# Patient Record
Sex: MALE | Race: BLACK OR AFRICAN AMERICAN | Employment: OTHER | URBAN - METROPOLITAN AREA
[De-identification: names, ages, dates, MRNs, and addresses within clinical notes are randomized per-mention and may not be internally consistent; named-entity substitution may affect disease eponyms.]

---

## 2017-10-23 ENCOUNTER — OFFICE VISIT (OUTPATIENT)
Dept: NEUROLOGY | Age: 77
End: 2017-10-23

## 2017-10-23 DIAGNOSIS — R44.3 HALLUCINATIONS: ICD-10-CM

## 2017-10-23 DIAGNOSIS — R41.3 MEMORY LOSS: Primary | ICD-10-CM

## 2017-10-23 RX ORDER — DONEPEZIL HYDROCHLORIDE 10 MG/1
10 TABLET, FILM COATED ORAL
Qty: 30 TAB | Refills: 5 | Status: SHIPPED | OUTPATIENT
Start: 2017-10-23 | End: 2018-08-14 | Stop reason: SDUPTHER

## 2017-10-23 RX ORDER — PANTOPRAZOLE SODIUM 40 MG/1
TABLET, DELAYED RELEASE ORAL
COMMUNITY
Start: 2017-08-30

## 2017-10-23 NOTE — MR AVS SNAPSHOT
Visit Information Date & Time Provider Department Dept. Phone Encounter #  
 10/23/2017 11:00 AM Robert Smith MD [facility] [phone] [id number] Upcoming Health Maintenance Date Due DTaP/Tdap/Td series (1 - Tdap) 1/5/1960 ZOSTER VACCINE AGE 60> 11/5/1998 GLAUCOMA SCREENING Q2Y 1/5/2004 Pneumococcal 65+ Low/Medium Risk (1 of 2 - PCV13) 1/5/2004 MEDICARE YEARLY EXAM 1/5/2004 INFLUENZA AGE 9 TO ADULT 8/1/2017 Allergies as of 10/23/2017  Never Reviewed Not on File Current Immunizations  Never Reviewed No immunizations on file. Not reviewed this visit You Were Diagnosed With   
  
 Codes Comments Memory loss    -  Primary ICD-10-CM: R41.3 ICD-9-CM: 780.93 Hallucinations     ICD-10-CM: R44.3 ICD-9-CM: 780.1 Preferred Pharmacy Pharmacy Name Phone [facility] [address] [place] [address] [phone] Your Updated Medication List  
  
   
This list is accurate as of: 10/23/17 12:05 PM.  Always use your most recent med list.  
  
  
  
  
 donepezil 10 mg tablet Commonly known as:  ARICEPT Take 1 Tab by mouth nightly. Take one tablet at night  
  
 pantoprazole 40 mg tablet Commonly known as:  PROTONIX Prescriptions Sent to Pharmacy Refills  
 donepezil (ARICEPT) 10 mg tablet 5 Sig: Take 1 Tab by mouth nightly. Take one tablet at night Class: Normal  
 Pharmacy: [facility] [address] [place] AT [address] #: [phone] Route: Oral  
  
We Performed the Following REFERRAL TO PSYCHOLOGY [ZVZ74 Custom] Comments:  
 Pt with short term memory loss. Please eval  
 TSH 3RD GENERATION [id number] CPT(R)] VITAMIN B12 & FOLATE [70075 CPT(R)] To-Do List   
 10/24/2017 Imaging:  CT HEAD WO CONT   
  
 10/24/2017 Lab: VITAMIN D, 1, 25 DIHYDROXY Referral Information Referral ID Referred By Referred To  
  
 [id number] Megan Denny [address] [place] [address] [address], [place] Phone: [phone] Fax: [phone] Visits Status Start Date End Date 1 New Request 10/23/17 10/23/18 If your referral has a status of pending review or denied, additional information will be sent to support the outcome of this decision. Patient Instructions Patient Instructions/Plans: · We will get a CT of the head and lab work · We will order neuropsychological testing · We will start on Aricept 10 mg daily for memory loss Donepezil (By mouth) Donepezil ([id number]) Treats Alzheimer disease. Brand Name(s): Aricept There may be other brand names for this medicine. When This Medicine Should Not Be Used: This medicine is not right for everyone. Do not use it if you had an allergic reaction to donepezil or to medicines that contain piperidine. How to Use This Medicine:  
Tablet, Dissolving Tablet · Your doctor will tell you how much medicine to use. Do not use more than directed. · Read and follow the patient instructions that come with this medicine. Talk to your doctor or pharmacist if you have any questions. · Tablet: Swallow the 23-mg tablet whole. Do not crush, break, or chew it. · Disintegrating tablet:Make sure your hands are dry before you handle the disintegrating tablet. Peel back the foil from the blister pack, then remove the tablet. Do not push the tablet through the foil. Place the tablet in your mouth. After it has melted, swallow or take a drink of water. · Missed dose: Skip the missed dose and take your next dose at the regular time. Do not take extra medicine to make up for a missed dose. · Store the medicine in a closed container at room temperature, away from heat, moisture, and direct light. Drugs and Foods to Avoid: Ask your doctor or pharmacist before using any other medicine, including over-the-counter medicines, vitamins, and herbal products. · Some medicines can affect how donepezil works. Tell your doctor if you are using any of the following: ¨ Bethanechol, carbamazepine, dexamethasone, ketoconazole, phenobarbital, phenytoin, quinidine, or rifampin ¨ NSAID pain or arthritis medicine (such as aspirin, diclofenac, ibuprofen, naproxen) Warnings While Using This Medicine: · Tell your doctor if you are pregnant or breastfeeding, or if you have heart disease, lung disease, asthma or other breathing problems, stomach ulcers or bleeding, or trouble urinating. · This medicine may cause the following problems: ¨ Slow heartbeat ¨ Stomach or bowel bleeding ¨ Seizures · Tell any doctor or dentist who treats you that you are using this medicine. You may need to stop using this medicine several days before you have surgery or medical tests. · Your doctor will check your progress and the effects of this medicine at regular visits. Keep all appointments. · Keep all medicine out of the reach of children. Never share your medicine with anyone. Possible Side Effects While Using This Medicine:  
Call your doctor right away if you notice any of these side effects: · Allergic reaction: Itching or hives, swelling in your face or hands, swelling or tingling in your mouth or throat, chest tightness, trouble breathing · Bloody or black, tarry stools · Change in how much or how often you urinate · Chest pain, slow or uneven heartbeat, trouble breathing · Lightheadedness, dizziness, fainting · Seizures · Severe stomach pain · Unusual bleeding, bruising, or weakness · Vomiting of blood or material that looks like coffee grounds If you notice these less serious side effects, talk with your doctor: · Mild nausea, vomiting, or diarrhea · Weight loss If you notice other side effects that you think are caused by this medicine, tell your doctor. Call your doctor for medical advice about side effects. You may report side effects to FDA at 6-896-EIN-4292 © 2017 [employer] Information is for End User's use only and may not be sold, redistributed or otherwise used for commercial purposes. The above information is an  only. It is not intended as medical advice for individual conditions or treatments. Talk to your doctor, nurse or pharmacist before following any medical regimen to see if it is safe and effective for you. Introducing \[facility] [place] [facility]\"" & HEALTH SERVICES! [facility] introduces MYFLY patient portal. Now you can access parts of your medical record, email your doctor's office, and request medication refills online. 1. In your internet browser, go to [email or web address] 2. Click on the First Time User? Click Here link in the Sign In box. You will see the New Member Sign Up page. 3. Enter your MYFLY Access Code exactly as it appears below. You will not need to use this code after youve completed the sign-up process. If you do not sign up before the expiration date, you must request a new code. · MYFLY Access Code: [email or web address] Expires: 1/21/2018 12:03 PM 
 
4. Enter the last four digits of your Social Security Number (xxxx) and Date of Birth (mm/dd/yyyy) as indicated and click Submit. You will be taken to the next sign-up page. 5. Create a MYFLY ID. This will be your MYFLY login ID and cannot be changed, so think of one that is secure and easy to remember. 6. Create a MYFLY password. You can change your password at any time. 7. Enter your Password Reset Question and Answer. This can be used at a later time if you forget your password. 8. Enter your e-mail address. You will receive e-mail notification when new information is available in [address] 9. Click Sign Up. You can now view and download portions of your medical record. 10. Click the Download Summary menu link to download a portable copy of your medical information. If you have questions, please visit the Frequently Asked Questions section of the Apruve website. Remember, Apruve is NOT to be used for urgent needs. For medical emergencies, dial 911. Now available from your iPhone and Android! Please provide this summary of care documentation to your next provider. If you have any questions after today's visit, please call [phone].

## 2017-10-23 NOTE — PATIENT INSTRUCTIONS
Patient Instructions/Plans:  · We will get a CT of the head and lab work  · We will order neuropsychological testing  · We will start on Aricept 10 mg daily for memory loss    Donepezil (By mouth)   Donepezil ([id number])  Treats Alzheimer disease. Brand Name(s): Aricept   There may be other brand names for this medicine. When This Medicine Should Not Be Used: This medicine is not right for everyone. Do not use it if you had an allergic reaction to donepezil or to medicines that contain piperidine. How to Use This Medicine:   Tablet, Dissolving Tablet  · Your doctor will tell you how much medicine to use. Do not use more than directed. · Read and follow the patient instructions that come with this medicine. Talk to your doctor or pharmacist if you have any questions. · Tablet: Swallow the 23-mg tablet whole. Do not crush, break, or chew it. · Disintegrating tablet:Make sure your hands are dry before you handle the disintegrating tablet. Peel back the foil from the blister pack, then remove the tablet. Do not push the tablet through the foil. Place the tablet in your mouth. After it has melted, swallow or take a drink of water. · Missed dose: Skip the missed dose and take your next dose at the regular time. Do not take extra medicine to make up for a missed dose. · Store the medicine in a closed container at room temperature, away from heat, moisture, and direct light. Drugs and Foods to Avoid:   Ask your doctor or pharmacist before using any other medicine, including over-the-counter medicines, vitamins, and herbal products. · Some medicines can affect how donepezil works.  Tell your doctor if you are using any of the following:   ¨ Bethanechol, carbamazepine, dexamethasone, ketoconazole, phenobarbital, phenytoin, quinidine, or rifampin  ¨ NSAID pain or arthritis medicine (such as aspirin, diclofenac, ibuprofen, naproxen)  Warnings While Using This Medicine:   · Tell your doctor if you are pregnant or breastfeeding, or if you have heart disease, lung disease, asthma or other breathing problems, stomach ulcers or bleeding, or trouble urinating. · This medicine may cause the following problems:   ¨ Slow heartbeat  ¨ Stomach or bowel bleeding  ¨ Seizures  · Tell any doctor or dentist who treats you that you are using this medicine. You may need to stop using this medicine several days before you have surgery or medical tests. · Your doctor will check your progress and the effects of this medicine at regular visits. Keep all appointments. · Keep all medicine out of the reach of children. Never share your medicine with anyone. Possible Side Effects While Using This Medicine:   Call your doctor right away if you notice any of these side effects:  · Allergic reaction: Itching or hives, swelling in your face or hands, swelling or tingling in your mouth or throat, chest tightness, trouble breathing  · Bloody or black, tarry stools  · Change in how much or how often you urinate  · Chest pain, slow or uneven heartbeat, trouble breathing  · Lightheadedness, dizziness, fainting  · Seizures  · Severe stomach pain  · Unusual bleeding, bruising, or weakness  · Vomiting of blood or material that looks like coffee grounds  If you notice these less serious side effects, talk with your doctor:   · Mild nausea, vomiting, or diarrhea  · Weight loss  If you notice other side effects that you think are caused by this medicine, tell your doctor. Call your doctor for medical advice about side effects. You may report side effects to FDA at 6-123-FDA-6407  © 2017 [address] Information is for End User's use only and may not be sold, redistributed or otherwise used for commercial purposes. The above information is an  only. It is not intended as medical advice for individual conditions or treatments.  Talk to your doctor, nurse or pharmacist before following any medical regimen to see if it is safe and effective for you.

## 2017-10-23 NOTE — PROGRESS NOTES
NEUROLOGY NEW PATIENT CONSULTATION    REFERRED BY:  No primary care provider on file. CHIEF COMPLAINT:  Memory loss    HISTORY OF PRESENT ILLNESS    HISTORY PROVIDED BY:  Patient  Family Member: Daughter      Yovani Reyes is a 65 y.o. male who I am asked to see in consultation for memory loss. Patient has had issues with his memory for over a year. He previously lived in [facility] and just moved here to be with his daughter. Now that he has been living with his daughter over the past month she has noticed that his memory changes have been more significant. Today today they noticed that he has issues with recall. He is constantly asking same questions again. He will good days and bad days. Patient recognizes on memory loss. He saw a special on the TV about Alzheimer's disease and also start he is concerned that he may have this. He does have a family history of dementia and his sister. Patient does well in the mornings and gets worse as the day goes on. He does have some days that are better. He does not have any issues with wandering but one that he did get up in the night and going another room of the house and was confused about why he was there. Patient is also developed visual hallucinations. He sees animals such as a fish or a snake in the couch. He also will think some of these been in the home and they have not. Patient will repeat himself. He does have issues with word recall. They were at the grocery store the other day and he forgot when a marshmallow was. He felt like his to be in the produce section. He has never handled his own finances. His daughter handles that for now. Additionally he is on multiple medications daily but his daughter helps manage that as well. Patient did have some concern for depression previously. He was placed on Cymbalta and this has helped. He does have some issues with sleeping at night. However, he does nap quite a lot during the day.   He is started on melatonin. He feels like when he sleeps well at night he has a better day the next day. His daughter also notes that he is an occasional hand tremor on the left hand. He is left-hand-dominant. He will have episodes where he will get frozen with his gait at times. She seems to associate this when he has an occasional cigarette. He does have a history of alcohol abuse. She notes that when he drinks any alcohol now this seems to exacerbate his symptoms. He limits that now. Patient does not cook or clean. He will microwave something but that is all. Patient will take walks of his daughter takes in. Otherwise he is not very motivated to do things on his own. He has had some urinary incontinence. He denies any new vision changes. PMH  History of bypass. Patient does have a defibrillator. History of GI bleed  COPD  History of alcohol abuse    SH  Social History     Social History    Marital status:      Spouse name: N/A    Number of children: N/A    Years of education: N/A     Social History Main Topics    Smoking status: Not on file    Smokeless tobacco: Not on file    Alcohol use Not on file    Drug use: Not on file    Sexual activity: Not on file     Other Topics Concern    Not on file     Social History Narrative       FH  Sister with dementia    ALLERGIES   ace inhibitors     CURRENT MEDS  Current Outpatient Prescriptions   Medication Sig Dispense Refill    pantoprazole (PROTONIX) 40 mg tablet       donepezil (ARICEPT) 10 mg tablet Take 1 Tab by mouth nightly.  Take one tablet at night 30 Tab 5   Valsartan  Potassium  Atorvastatin  Furosemide  Duloxetine 60 mg daily  Carvedilol  Melatonin  Pro-air  Patient was previously on Plavix and aspirin but has stopped this due to history of GI bleed    REVIEW OF SYSTEMS:     Y  N       Y  N  Y  N   Y  N  [] [x] AIDS          [] [x] Falls  [x] [] Memory Loss  [] [x]  Shortness of breath  [] [x] Anxiety          [] [x] Fatigue [] [x] Muscle Pain        [] [x]  Skipped beats  [] [x] Chest Pain   [] [x] Frequent HA [] [x] Ms Weakness     [x] []  Snoring  [] [x] Constipation [] [x]Hearing loss [] [x] Nause/Vomiting  [] [x]  Stomach Pain  [] [x] Cough          [] [x]Hepatitis [] [x] Neuropathy         [] [x]  Swallowing difficulty  [x] [] Depression  [] [x]Incontinence [x] [] Poor appetite      [] [x]  Vertigo  [] [x] Diarrhea       [] [x] Joint Pain [] [x] Rash                   [] [x]  Visual disturbances  [] [x] Fainting        [] [x] Leg Swelling [] [x] Ringing ears       [x] [x]  Weight changes      []Unable to obtain  ROS due to  []mental status change  []sedated   []intubated          PREVIOUS WORKUP  IMAGING: None      LABS  No results found for this or any previous visit. PHYSICAL EXAM  Blood pressure 95/60 pulse 83  General:  Alert, cooperative, no distress. Head:  Normocephalic, without obvious abnormality, atraumatic. Eyes:  Conjunctivae/corneas clear. Pupils equal, round, reactive to light. Extraocular movements intact, VFF, NO papilledema   Lungs:  Heart:   Non labored breathing  Regular rate and rhythm, no carotid bruits   Abdomen:   Soft, non-distended   Extremities: Extremities normal, atraumatic, no cyanosis or edema. Pulses: 2+ and symmetric all extremities. Skin: Skin color, texture, turgor normal. No rashes or lesions. Neurologic:  Gen: Attention normal             Language: naming, repetition, fluency normal             Memory: Alert and oriented to self and partial date. 1/3 word recall. 3/3 with category cue.   Unable to spell world backwards  Cranial Nerves:  I: smell Not tested   II: visual fields Full to confrontation   II: pupils Equal, round, reactive to light   II: optic disc No papilledema   III,VII: ptosis none   III,IV,VI: extraocular muscles  Full ROM   V: mastication normal   V: facial light touch sensation  normal   VII: facial muscle function   symmetric   VIII: hearing symmetric   IX: soft palate elevation  normal   XI: trapezius strength  5/5   XI: sternocleidomastoid strength 5/5   XI: neck flexion strength  5/5   XII: tongue  midline     Motor: Positive cogwheel rigidity, no tremor              Strength: 5/5 all four extremities  Sensory: intact to LT, PP, vibration, and temperature  Coordination: FTN intact, Rhomberg negative  Gait: Slightly wide-based  Reflexes: 2+ throughout       IMPRESSION  Jovan Terry is a 65 y.o. male who presents for evaluation of short-term memory loss. This has been progressive over the past year. Patient has not had evaluation for reversible causes. He does have some issues with gait changes and urinary incontinence so will rule out NPH with a CT of the head. We will also check memory labs. Additionally I think patient would benefit from neuropsych testing. At this point I do think we should start him on Aricept given his testing and history today. RECOMMENDATIONS  1.  CT of the head  (patient unable to get MRI due to defibrillator)  2. Check TSH, vitamin B12, vitamin D  3. Discussed dementia, pseudodementia, and Lewy body dementia  4. Start Aricept 10 mg daily. Discussed possible side effects of orthostatic hypotension. Patient will monitor closely for this and take the medication at night. Side effects discussed. Information given to patient. Prescription sent in  5. Referral for neuropsychological testing  6. Continue Cymbalta for depression  7. Encourage patient to be socially, physically, and mentally active    FU 3 months    Edvin Culp MD    CC: No primary care provider on file. Fax: None    This note was created using voice recognition software. Despite editing, there may be syntax errors. This note will not be viewable in [address]

## 2017-10-24 DIAGNOSIS — R44.3 HALLUCINATIONS: ICD-10-CM

## 2017-10-24 DIAGNOSIS — R41.3 MEMORY LOSS: ICD-10-CM

## 2017-10-25 LAB
1,25(OH)2D3 SERPL-MCNC: 25.8 PG/ML (ref 19.9–79.3)
FOLATE SERPL-MCNC: >20 NG/ML
TSH SERPL DL<=0.005 MIU/L-ACNC: 2.63 UIU/ML (ref 0.45–4.5)
VIT B12 SERPL-MCNC: 841 PG/ML (ref 211–946)

## 2017-10-31 ENCOUNTER — HOSPITAL ENCOUNTER (OUTPATIENT)
Dept: CT IMAGING | Age: 77
Discharge: HOME | End: 2017-10-31
Attending: PSYCHIATRY & NEUROLOGY
Payer: MEDICARE

## 2017-10-31 DIAGNOSIS — R41.3 MEMORY LOSS: ICD-10-CM

## 2017-10-31 DIAGNOSIS — R44.3 HALLUCINATIONS: ICD-10-CM

## 2017-10-31 PROCEDURE — 70450 CT HEAD/BRAIN W/O DYE: CPT

## 2017-11-02 ENCOUNTER — TELEPHONE (OUTPATIENT)
Dept: NEUROLOGY | Age: 77
End: 2017-11-02

## 2017-11-02 NOTE — TELEPHONE ENCOUNTER
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 Mrs. Jadyn Morrison can be reached at [phone].

## 2017-11-02 NOTE — TELEPHONE ENCOUNTER
Contacted patient's daughter Maynor Raman and informed her that the CT did not show any acute abnormalities. She is concerned because she states she needs a diagnosis for dementia. I asked her is the patient has his neuro phych, she says no, I advised her to make this appointment with number provided and for him to continue taking his Aricept as ordered. Understanding voiced.

## 2017-11-20 ENCOUNTER — OFFICE VISIT (OUTPATIENT)
Dept: NEUROLOGY | Age: 77
End: 2017-11-20

## 2017-11-20 DIAGNOSIS — R41.3 MEMORY LOSS: ICD-10-CM

## 2017-11-20 DIAGNOSIS — F10.11 ALCOHOL ABUSE, IN REMISSION: ICD-10-CM

## 2017-11-20 DIAGNOSIS — G31.84 MILD COGNITIVE IMPAIRMENT: Primary | ICD-10-CM

## 2017-11-20 DIAGNOSIS — R44.3 HALLUCINATIONS: ICD-10-CM

## 2017-11-20 NOTE — PROGRESS NOTES
[address] [facility],[address]  [address]   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 Please refer to his medical records for details pertaining to his history. He completed the 11th grade without repeating a grade. He is not currently working. Briefly, the patient reported problems with memory getting worse over the past year or more. He used to live in [place] and moved here to be with his daughter, who has noticed marked decline in memory. He asks the same question over and over. Good days and bad days. Worse towards the end of the day. Losing words. Starts tasks and does not complete. Word finding problems. He is confused. He is hallucinating. He sees animals like fish and snakes on the couch. Thinks people in the house that are not there. Dementia - his sister had it as well. They were at the grocery store the other day and he forgot when a marshmallow was. He felt like his to be in the produce section. He has never done his own bills and daughter does that now, and daughter assists with medications also. He has a prior history of depression and was put on Cymbalta which was helpful. He naps a lot during the day. Tried melatonin. Gait seems frozen when he is smoking. He has a history of alcohol abuse and continues to drink but to a lesser extent. He does not cook or clean save for using the microwave. He has had some urinary incontinence. He denies any new vision changes. He has been on Aricept for about a month now. No meds for mood or psychosis. Gets ornery at night. No changes behaviors. Not driving.    Gait is declining and getting more assistance with toileting and bathing of late. No previous neuropsych testing. Neuropsychological Mental Status Exam (NMSE):  Historian: Tiarra, daughter assists  Praxis: No UE apraxia  R/L Orientation: Intact to self and to other  Dress: within normal limits   Weight: within normal limits   Appearance/Hygiene: within normal limits   Gait: not assessed  Assistive Devices: WC  Mood: mildly depressed  Affect: Flat   Comprehension: within normal limits   Thought Process: within normal limits   Expressive Language: within normal limits   Receptive Language: within normal limits   Motor:  No cognitive or motor perseveration  ETOH: history of alcohol abuse and has stopped essentially but had a sip of wine or so last night. Tobacco: Denied  Illicit: Denied  SI/HI: Denied  Psychosis: visual hallucinations as noted  Insight:Fair  Judgment: TBD  Other Psych:      History reviewed. No pertinent past medical history. History reviewed. No pertinent surgical history. Allergies   Allergen Reactions    Ace Inhibitors Hives       History reviewed. No pertinent family history. Social History   Substance Use Topics    Smoking status: None    Smokeless tobacco: None    Alcohol use None       Current Outpatient Prescriptions   Medication Sig Dispense Refill    pantoprazole (PROTONIX) 40 mg tablet       donepezil (ARICEPT) 10 mg tablet Take 1 Tab by mouth nightly. Take one tablet at night 30 Tab 5         Plan:  Obtain authorization for testing from insurance company. Report to follow once testing, scoring, and interpretation completed. ? Organic based neurocognitive issues versus mood disorder or combination of same. ? Problems organic, functional, or both? This note will not be viewable in [address] 65year old male with progressive decline in memory as well as psychosis. History of depression. 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   Eval needed for MCI, dementia, mood related issues, combination of same?

## 2018-06-11 ENCOUNTER — OFFICE VISIT (OUTPATIENT)
Dept: NEUROLOGY | Age: 78
End: 2018-06-11

## 2018-06-11 DIAGNOSIS — R41.3 MEMORY LOSS: ICD-10-CM

## 2018-06-11 DIAGNOSIS — R44.3 HALLUCINATIONS: ICD-10-CM

## 2018-06-11 DIAGNOSIS — F10.11 ALCOHOL ABUSE, IN REMISSION: ICD-10-CM

## 2018-06-11 DIAGNOSIS — G31.84 MILD COGNITIVE IMPAIRMENT: Primary | ICD-10-CM

## 2018-06-14 ENCOUNTER — OFFICE VISIT (OUTPATIENT)
Dept: NEUROLOGY | Age: 78
End: 2018-06-14

## 2018-06-14 DIAGNOSIS — F32.A ANXIETY AND DEPRESSION: ICD-10-CM

## 2018-06-14 DIAGNOSIS — F10.21 ALCOHOL DEPENDENCE IN EARLY, EARLY PARTIAL, SUSTAINED FULL, OR SUSTAINED PARTIAL REMISSION (HCC): ICD-10-CM

## 2018-06-14 DIAGNOSIS — F41.9 ANXIETY AND DEPRESSION: ICD-10-CM

## 2018-06-14 DIAGNOSIS — F03.92 DEMENTIA WITH PSYCHOSIS: Primary | ICD-10-CM

## 2018-06-14 NOTE — PROGRESS NOTES
[address] [facility],5Th [address]  [address]   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 left handed    male who was accompanied by his daughter  to the initial clinical interview on 11/20/17 . Please refer to his medical records for details pertaining to his history. He completed the 11th grade without repeating a grade. He is not currently working. Briefly, the patient reported problems with memory getting worse over the past year or more. He used to live in [place] and moved here to be with his daughter, who has noticed marked decline in memory. He asks the same question over and over. Good days and bad days. Worse towards the end of the day. Losing words. Starts tasks and does not complete. Word finding problems. He is confused. He is hallucinating. He sees animals like fish and snakes on the couch. Thinks people in the house that are not there. Dementia - his sister had it as well. They were at the grocery store the other day and he forgot when a marshmallow was. He felt like his to be in the produce section. He has never done his own bills and daughter does that now, and daughter assists with medications also. He has a prior history of depression and was put on Cymbalta which was helpful. He naps a lot during the day. Tried melatonin. Gait seems frozen when he is smoking. He has a history of alcohol abuse and continues to drink but to a lesser extent. He does not cook or clean save for using the microwave. He has had some urinary incontinence. He denies any new vision changes. He has been on Aricept for about a month now. No meds for mood or psychosis. Gets ornery at night. No changes behaviors. Not driving.    Gait is declining and getting more assistance with toileting and bathing of late. No previous neuropsych testing. Neuropsychological Mental Status Exam (NMSE):  Historian: Tiarra, daughter assists  Praxis: No UE apraxia  R/L Orientation: Intact to self and to other  Dress: within normal limits   Weight: within normal limits   Appearance/Hygiene: within normal limits   Gait: not assessed  Assistive Devices: WC  Mood: mildly depressed  Affect: Flat   Comprehension: within normal limits   Thought Process: within normal limits   Expressive Language: within normal limits   Receptive Language: within normal limits   Motor:  No cognitive or motor perseveration  ETOH: history of alcohol abuse and has stopped essentially but had a sip of wine or so last night. Tobacco: Denied  Illicit: Denied  SI/HI: Denied  Psychosis: visual hallucinations as noted  Insight:Fair  Judgment: TBD  Other Psych:      History reviewed. No pertinent past medical history. History reviewed. No pertinent surgical history. Allergies   Allergen Reactions    Ace Inhibitors Hives       History reviewed. No pertinent family history. Social History   Substance Use Topics    Smoking status: None    Smokeless tobacco: None    Alcohol use None       Current Outpatient Prescriptions   Medication Sig Dispense Refill    pantoprazole (PROTONIX) 40 mg tablet       donepezil (ARICEPT) 10 mg tablet Take 1 Tab by mouth nightly. Take one tablet at night 30 Tab 5         Plan:  Obtain authorization for testing from insurance company. Report to follow once testing, scoring, and interpretation completed. ? Organic based neurocognitive issues versus mood disorder or combination of same. ? Problems organic, functional, or both? This note will not be viewable in [address] 65year old male with progressive decline in memory as well as psychosis. History of depression. 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   Eval needed for MCI, dementia, mood related issues, combination of same? Neuropsychological Evaluation  Patient Testing 6/11/18 & 6/14/18 Report Completed 6/14/18  A Psychometrist Assisted w/ portions of this evaluation while under my direct supervision    Please refer to the patient's initial interview progress note (copied above) and her medical records for details pertaining to her history. Today's neuropsychological test scores follow: The following assessment procedures were performed:      Neuropsychologist Performed, Interpreted, & Reported: Neuropsychological Mental Status Exam, Revised Memory & Behavior Checklist, Mini Mental State Exam, Clock Drawing Test, Test Of Premorbid Functioning, Safia-Melzack Pain Questionnaire,  History Taking  & Clinical Interview With The Patient, Additional History Taking w/ The Patient's Daughter,  ABAS-3, Review Of Available Records. Psychometrist Administered & Neuropsychologist Interpreted & Neuropsychologist Reported: Verbal Fluency Tests, Elvin & Elvin - Revised, CPT-III, Repeatable Battery For The Assessment Of Neuropsychological Status, Bessie Dementia Rating Scale - 2, WASI-II, Trailmaking Test Parts A & B, Geriatric Depression Inventory, Olsen Anxiety Inventory, River Symptom Checklist.     Test Findings:  Note:  The patients raw data have been compared with currently available norms which include demographic corrections for age, gender, and/or education. Sometimes, the patients scores are compared to demographically similar individuals as close to the patients age, education level, etc., as possible. \"Average\" is viewed as being +/- 1 standard deviation (SD) from the stated mean for a particular test score. \"Low average\" is viewed as being between 1 and 2 SD below the mean, and above average is viewed as being 1 and 2 SD above the mean.   Scores falling in the borderline range (between 1-1/2 and 2 SD below the mean) are viewed with particular attention as to whether they are normal or abnormal neurocognitive test scores. Other methods of inference in analyzing the test data are also utilized, including the pattern and range of scores in the profile, bilateral motor functions, and the presence, if any, of pathognomonic signs. Behaviorally, the patient was friendly and cooperative and appeared motivated to perform well during this examination. Within this context, the results of this evaluation are viewed as a valid reflection of the patients actual neurocognitive and emotional status. The patient's score of 17/30 on the Mini-Mental Status Exam was impaired. In this regard, he was not oriented to year, season, month, day, date, county, city, or floor. Registration of three words was 2/3 correct on Trial 1. Backwards spelling was 4/5 correct. Recall for three words after a brief delay was 2/3 correct. Writing was impaired. Visual construction was impaired. Clock drawing was severely impaired. His structured word list fluency, as assessed by the FAS Test, was within the mildly impaired range with a T score of 39. Category fluency was within the below average range with a T score of 42. Confrontation naming ability, as assessed by the [facility] - Revised, was within the mildly impaired range at 34/60 correct (T = 39). This pattern of performance is indicative of a patient who is at increased risk for day-to-day problems with verbal fluency and confrontation naming ability was also impaired. The patient was administered the Wechsler Abbreviated Scale of Intelligence - II and he generated an Estimated VCI score of 76 (5th %ile), DANNY - 59 (0.5th %ile), and his Estimated Full-4 IQ score was 66 (1st %ile). These scores are statistically weaker than what would be predicted given his performance on a task estimating premorbid functioning levels, and this pattern of performance is suggestive of a decline from premorbid Intellectual Functioning.   All of his generated IQ scores are within the low average range. The patient was administered the CPT-III and review of the subscales revealed numerous problems with inattentiveness without concerns for problems with impulsivity. This pattern of performance is indicative of a patient who is at increased risk for day-to-day problems with sustained visual attention/concentration. The patient was administered the Bessie Dementia Rating Scale -2 and his total score of 96/144 correct corresponds to an age- and education- corrected MOANS Scaled Score of 1 (<1st %ile) and indicates a severely impaired level of performance. In this regard, his simple attention was normal.  At the same time, his performance across all other neurocognitive domains assessed, including initiation/perseveration, construction, conceptualization, and memory capacity were all impaired. This pattern of performance is indicative of a patient who is at increased risk for day-to-day problems across a variety of neurocognitive domains. The patient was administered the Repeatable Battery for the Assessment Of Neuropsychological Status (RBANS) and his age-corrected scores are as follows:    Domain:    Index Score %ile Classification    Immediate Memory  49  <0.1  Severe Impairment    Delayed Memory  48  <0.1 Severe Impairment    Visuospatial/Constructional  50  <0.1 Severe Impairment    Language   68  2 Severe Impairment    Attention   53  0.1 Severe Impairment    Overall Functioning  48  <0.1 Severe Impairment   ___________________________________________________________    As can be seen, he is showing marked deficits across a variety of neurocognitive domains with no areas of neurocognitive strength identified. This pattern of performance is indicative of a patient who is at increased risk for day-to-day problems across a variety of neurocognitive domains.        Simple timed visual motor sequencing (Trailmaking Test Part A) was within the mildly to moderately impaired range with a T score of 32. His performance on a similar, but more complex task of timed visual motor sequencing (Trailmaking Test Part B) was within the mildly to moderately impaired range with a T score of 34. This latter test was discontinued. This pattern of performance is indicative of a patient who is at increased risk for day-to-day problems with executive functioning. The patient rated his current level of pain as \"2/5 - Discomforting\" on the Safia-Melzack Pain Questionnaire. He reported pain his lower back. His Geriatric Depression Inventory score of 12 was within the mildly depressed range. His Olsen Anxiety Inventory Score of 13 reflected mild anxiety. The patient's responses on the [facility] Symptom Checklist did not reveal concern for additional psychopathology. The family completed the ABAS-3 and reported borderline range concerns regarding the patient's general adaptive skills, conceptual skills, social skills, and practical skills. Impressions and Recommendations: This patient generated an abnormal range Neuropsychological Evaluation with respect to neurocognitive functioning. In this regard, impairments were noted across the vast majority of neurocognitive domains assessed. Some language and simple attention abilities remain important areas of relative neurocognitive strength, but these likely mask underlying cognitive deficits at times. Emotionally, there is concern for mild depression and anxiety. He has a history of alcohol abuse in partial sustained remission. He is psychotic as well and this appears dementia related. In my opinion, this profile is consistent with an evolving organic process that is currently at a moderate level of severity. This is likely AD - the profile doesn't quite fit with alcohol related dementia, but that may have been a factor as well.  Anxiety and depression issues exacerbate, but this is not a pseudodementia. I suggest consideration for memory management medication as well as treatment for depression and anxiety. Psychiatric treatment for psychosis is also advised. Consider non-stimulant medication for attention if not medically contraindicated. I advise abstinence from alcohol. The patient should be encouraged to remain as mentally, socially, and physically active as possible. The patient's generated cognitive difficulties are significant to the degree whereby it is my opinion that he should not live independently without appropriate supervision across virtually all ADLs, but especially those pertaining to memory. This includes nutritional/meal preparation supervision, medication management supervision, and supervision of financial dealings. The family already assists with same and so long as they are able to do so, I agree with this plan. Should their current living status change, the family and patient may wish to consider placement in an assisted living type facility that can provide appropriate supervision and safety for the patient. I agree that he should not be driving. I do not believe that he is competent to manage his own affairs at this time. Baseline now established. Follow up prn. Clinical correlation is, of course, indicated. I will discuss these findings with the patient and family when they follow up with me in the near future. A follow up Neuropsychological Evaluation is indicated on a prn basis. DIAGNOSES:Moderate Dementia With Psychosis    Anxiety and Depression     The above information is based upon information currently available to me. If there is any additional information of which I am currently unaware, I would be more than happy to review it upon having it made available to me. Thank you for the opportunity to see this interesting individual.     Sincerely,       Nayeli Manley.  Anam Vazquez, EdS      Cc: Dr. Carmelo Doyle    Time Documentation    2 units -11638-  1.75 hours. Record review. Review of history provided by patient. Testing by Neuropsychologist, Review of raw data. Scoring. Interpretation of all tests together in context/ Data interpretation/integration. Case Conceptualization, Report writing. Coordination Of Care. 4 units  -[id number] - 3.75 hours. Psychometrist test prep, administration, and scoring. Supervision Of Psychometrist. Neuropsych Interpretation of individual tests completed by psychometrist    \"Unit\" is defined by CPT/National Guidelines (31 - 60 minutes). Integral services including scoring of raw data, data interpretation, case conceptualization, report writing etcetera were initiated after the patient finished testing/raw data collected and was completed on the date the report was signed.

## 2018-06-14 NOTE — PROGRESS NOTES
Patient arrived for testing but all orderedtests not completed due to his slow time to completion. Will return and report to follow once testing, scoring, and interpretation completed.

## 2018-08-14 ENCOUNTER — OFFICE VISIT (OUTPATIENT)
Dept: NEUROLOGY | Age: 78
End: 2018-08-14

## 2018-08-14 ENCOUNTER — TELEPHONE (OUTPATIENT)
Dept: NEUROLOGY | Age: 78
End: 2018-08-14

## 2018-08-14 DIAGNOSIS — F03.92 DEMENTIA WITH PSYCHOSIS: Primary | ICD-10-CM

## 2018-08-14 DIAGNOSIS — R41.3 MEMORY LOSS: ICD-10-CM

## 2018-08-14 DIAGNOSIS — F41.9 ANXIETY AND DEPRESSION: ICD-10-CM

## 2018-08-14 DIAGNOSIS — F32.A ANXIETY AND DEPRESSION: ICD-10-CM

## 2018-08-14 DIAGNOSIS — F10.21 ALCOHOL DEPENDENCE IN EARLY, EARLY PARTIAL, SUSTAINED FULL, OR SUSTAINED PARTIAL REMISSION (HCC): ICD-10-CM

## 2018-08-14 DIAGNOSIS — R44.3 HALLUCINATIONS: ICD-10-CM

## 2018-08-14 RX ORDER — DONEPEZIL HYDROCHLORIDE 10 MG/1
10 TABLET, FILM COATED ORAL
Qty: 30 TAB | Refills: 3 | Status: SHIPPED | OUTPATIENT
Start: 2018-08-14 | End: 2018-09-12 | Stop reason: SDUPTHER

## 2018-08-14 NOTE — PROGRESS NOTES
Office feedback session with the patient and family today. I reviewed the results of the recent Neuropsychological Evaluation, including discussing individual tests as well as patient's areas of neurocognitive strength versus weakness. Education was provided regarding my diagnostic impressions, and we discussed treatment plan/options. I also answered numerous questions related to the clinical findings, including discussing various methods to improve cognition and mood. Counseling provided regarding mood and cognition. Discussed severe dementia with psychosis. Needs to see psychiatry and neurology for treatment. Prognosis poor. Assisted living type assistance discussed. Not competent. He is off of the Risperidone now,and he is more active and going tot he bathroom on his own. Hehad a couple of episodes of confusion. Doesn't seem to be hallucinating as much. Rest has a lot to do with it. Using medical marijuana. CBT and supportive psychotherapy techniques were utilized. The patient will follow up with the referring provider, and reported being very pleased with the services provided. Follow up with [facility] ([place], [place]) prn. 20 minutes with patient and familyt, record review, coordination of care. Records provided. We discussed: This patient generated an abnormal range Neuropsychological Evaluation with respect to neurocognitive functioning. In this regard, impairments were noted across the vast majority of neurocognitive domains assessed. Some language and simple attention abilities remain important areas of relative neurocognitive strength, but these likely mask underlying cognitive deficits at times. Emotionally, there is concern for mild depression and anxiety. He has a history of alcohol abuse in partial sustained remission.   He is psychotic as well and this appears dementia related.                             In my opinion, this profile is consistent with an evolving organic process that is currently at a moderate level of severity. This is likely AD - the profile doesn't quite fit with alcohol related dementia, but that may have been a factor as well. Anxiety and depression issues exacerbate, but this is not a pseudodementia. I suggest consideration for memory management medication as well as treatment for depression and anxiety. Psychiatric treatment for psychosis is also advised. Consider non-stimulant medication for attention if not medically contraindicated. I advise abstinence from alcohol. The patient should be encouraged to remain as mentally, socially, and physically active as possible.                             The patient's generated cognitive difficulties are significant to the degree whereby it is my opinion that he should not live independently without appropriate supervision across virtually all ADLs, but especially those pertaining to memory. This includes nutritional/meal preparation supervision, medication management supervision, and supervision of financial dealings. The family already assists with same and so long as they are able to do so, I agree with this plan. Should their current living status change, the family and patient may wish to consider placement in an assisted living type facility that can provide appropriate supervision and safety for the patient. I agree that he should not be driving. I do not believe that he is competent to manage his own affairs at this time. Baseline now established. Follow up prn. Clinical correlation is, of course, indicated.                           I will discuss these findings with the patient and family when they follow up with me in the near future.   A follow up Neuropsychological Evaluation is indicated on a prn basis.       DIAGNOSES:Moderate Dementia With Psychosis                                              Anxiety and Depression

## 2018-08-14 NOTE — TELEPHONE ENCOUNTER
Patient needs a refill on his aricept, he saw Dr. Cheryl Guan today afterwards his daughter came and asked about the medication.

## 2018-09-10 DIAGNOSIS — R41.3 MEMORY LOSS: ICD-10-CM

## 2018-09-10 DIAGNOSIS — R44.3 HALLUCINATIONS: ICD-10-CM

## 2018-09-12 RX ORDER — DONEPEZIL HYDROCHLORIDE 10 MG/1
10 TABLET, FILM COATED ORAL
Qty: 30 TAB | Refills: 3 | Status: SHIPPED | OUTPATIENT
Start: 2018-09-12 | End: 2019-01-11 | Stop reason: SDUPTHER

## 2018-09-12 NOTE — TELEPHONE ENCOUNTER
Patient needed script to go to [facility] Please review and sign if approved. Patient has an appt. With [facility] tomorrow discussed with daughter about the importance of keeping that appt.

## 2018-09-12 NOTE — TELEPHONE ENCOUNTER
Patient's daughter Belkys Garcia called stating that her father was prescribed aricept and had called for a refill last week to the [facility] delivery [facility] but [facility]. Daughter would like to have medicine ordered as soon as possible. Dr. Sy Watkins stated he should be put back on medicine asap following testing.  [phone]

## 2018-09-13 ENCOUNTER — OFFICE VISIT (OUTPATIENT)
Dept: NEUROLOGY | Age: 78
End: 2018-09-13

## 2018-09-13 VITALS
BODY MASS INDEX: 17.75 KG/M2 | WEIGHT: 124 LBS | RESPIRATION RATE: 20 BRPM | HEART RATE: 100 BPM | OXYGEN SATURATION: 98 % | HEIGHT: 70 IN | SYSTOLIC BLOOD PRESSURE: 102 MMHG | DIASTOLIC BLOOD PRESSURE: 64 MMHG

## 2018-09-13 DIAGNOSIS — F02.80 LATE ONSET ALZHEIMER'S DISEASE WITHOUT BEHAVIORAL DISTURBANCE (HCC): Primary | ICD-10-CM

## 2018-09-13 DIAGNOSIS — R41.3 MEMORY LOSS: ICD-10-CM

## 2018-09-13 DIAGNOSIS — G30.1 LATE ONSET ALZHEIMER'S DISEASE WITHOUT BEHAVIORAL DISTURBANCE (HCC): Primary | ICD-10-CM

## 2018-09-13 DIAGNOSIS — R44.3 HALLUCINATIONS: ICD-10-CM

## 2018-09-13 RX ORDER — DULOXETIN HYDROCHLORIDE 30 MG/1
30 CAPSULE, DELAYED RELEASE ORAL DAILY
COMMUNITY

## 2018-09-13 RX ORDER — DIGOXIN 125 MCG
TABLET ORAL DAILY
COMMUNITY

## 2018-09-13 RX ORDER — CARVEDILOL 6.25 MG/1
TABLET ORAL 2 TIMES DAILY WITH MEALS
COMMUNITY

## 2018-09-13 RX ORDER — NITROGLYCERIN 0.4 MG/1
0.4 TABLET SUBLINGUAL
COMMUNITY

## 2018-09-13 RX ORDER — TAMSULOSIN HYDROCHLORIDE 0.4 MG/1
0.4 CAPSULE ORAL DAILY
COMMUNITY

## 2018-09-13 RX ORDER — FAMOTIDINE 20 MG/1
20 TABLET, FILM COATED ORAL 2 TIMES DAILY
COMMUNITY

## 2018-09-13 RX ORDER — LORAZEPAM 0.5 MG/1
0.5 TABLET ORAL
COMMUNITY

## 2018-09-13 RX ORDER — FUROSEMIDE 40 MG/1
TABLET ORAL AS NEEDED
COMMUNITY

## 2018-09-13 RX ORDER — TRAZODONE HYDROCHLORIDE 50 MG/1
50 TABLET ORAL AS NEEDED
COMMUNITY

## 2018-09-13 RX ORDER — LOSARTAN POTASSIUM 25 MG/1
TABLET ORAL DAILY
COMMUNITY

## 2018-09-13 RX ORDER — PENTOXIFYLLINE 400 MG/1
400 TABLET, EXTENDED RELEASE ORAL 2 TIMES DAILY
COMMUNITY

## 2018-09-13 NOTE — MR AVS SNAPSHOT
[address] [facility] [address] [id number] [phone] Patient: Aman Breen MRN: [id number] Visit Information Date & Time Provider Department Dept. Phone Encounter #  
 9/13/2018 11:00 AM Yolie Harp NP [facility] Neurology [facility] [phone] [id number] Follow-up Instructions Return in about 6 weeks (around 10/25/2018). Upcoming Health Maintenance Date Due DTaP/Tdap/Td series (1 - Tdap) 1/5/1960 ZOSTER VACCINE AGE 60> 11/5/1998 GLAUCOMA SCREENING Q2Y 1/5/2004 Pneumococcal 65+ Low/Medium Risk (1 of 2 - PCV13) 1/5/2004 MEDICARE YEARLY EXAM 3/14/2018 Influenza Age 5 to Adult 8/1/2018 Allergies as of 9/13/2018  Review Complete On: 9/13/2018 By: Mallory Urbano LPN Severity Noted Reaction Type Reactions Ace Inhibitors  10/23/2017    Hives Current Immunizations  Never Reviewed No immunizations on file. Not reviewed this visit You Were Diagnosed With   
  
 Codes Comments Late onset Alzheimer's disease without behavioral disturbance    -  Primary ICD-10-CM: G30.1, F02.80 ICD-9-CM: 331.0, 294.10 Memory loss     ICD-10-CM: R41.3 ICD-9-CM: 780.93 Hallucinations     ICD-10-CM: R44.3 ICD-9-CM: 780.1 Vitals BP Pulse Resp Height(growth percentile) Weight(growth percentile) SpO2  
 102/64 100 20 5' 10\" (1.778 m) 124 lb (56.2 kg) 98% BMI  
  
  
  
  
 17.79 kg/m2 Vitals History BMI and BSA Data Body Mass Index Body Surface Area  
 17.79 kg/m 2 1.67 m 2 Preferred Pharmacy Pharmacy Name Phone [address] [place] [address] [phone] Your Updated Medication List  
  
   
This list is accurate as of 9/13/18 12:09 PM.  Always use your most recent med list.  
  
  
  
  
 carvedilol 6.25 mg tablet Commonly known as:  Reuben Vega  
 Take  by mouth two (2) times daily (with meals). CYMBALTA 30 mg capsule Generic drug:  DULoxetine Take 30 mg by mouth daily. digoxin 0.125 mg tablet Commonly known as:  LANOXIN Take  by mouth daily. donepezil 10 mg tablet Commonly known as:  ARICEPT Take 1 Tab by mouth nightly. Take one tablet at night FLOMAX 0.4 mg capsule Generic drug:  tamsulosin Take 0.4 mg by mouth daily. furosemide 40 mg tablet Commonly known as:  LASIX Take  by mouth as needed. iron bisgly,ps-FA-B-C#12-succ 65 mg-65 mg -1,000 mcg (24) Tab Take  by mouth. LORazepam 0.5 mg tablet Commonly known as:  ATIVAN Take 0.5 mg by mouth every eight (8) hours as needed for Anxiety. losartan 25 mg tablet Commonly known as:  COZAAR Take  by mouth daily. nitroglycerin 0.4 mg SL tablet Commonly known as:  NITROSTAT  
0.4 mg by SubLINGual route every five (5) minutes as needed for Chest Pain. Up to 3 doses. pantoprazole 40 mg tablet Commonly known as:  PROTONIX  
  
 pentoxifylline  mg CR tablet Commonly known as:  TRENTAL Take 400 mg by mouth two (2) times a day. PEPCID 20 mg tablet Generic drug:  famotidine Take 20 mg by mouth two (2) times a day. traZODone 50 mg tablet Commonly known as:  Conrad Franklin Take 50 mg by mouth as needed for Sleep. XARELTO 15 mg Tab tablet Generic drug:  rivaroxaban Take  by mouth daily. Follow-up Instructions Return in about 6 weeks (around 10/25/2018). Patient Instructions A Healthy Lifestyle: Care Instructions Your Care Instructions A healthy lifestyle can help you feel good, stay at a healthy weight, and have plenty of energy for both work and play. A healthy lifestyle is something you can share with your whole family. A healthy lifestyle also can lower your risk for serious health problems, such as high blood pressure, heart disease, and diabetes. You can follow a few steps listed below to improve your health and the health of your family. Follow-up care is a key part of your treatment and safety. Be sure to make and go to all appointments, and call your doctor if you are having problems. It's also a good idea to know your test results and keep a list of the medicines you take. How can you care for yourself at home? · Do not eat too much sugar, fat, or fast foods. You can still have dessert and treats now and then. The goal is moderation. · Start small to improve your eating habits. Pay attention to portion sizes, drink less juice and soda pop, and eat more fruits and vegetables. ¨ Eat a healthy amount of food. A 3-ounce serving of meat, for example, is about the size of a deck of cards. Fill the rest of your plate with vegetables and whole grains. ¨ Limit the amount of soda and sports drinks you have every day. Drink more water when you are thirsty. ¨ Eat at least 5 servings of fruits and vegetables every day. It may seem like a lot, but it is not hard to reach this goal. A serving or helping is 1 piece of fruit, 1 cup of vegetables, or 2 cups of leafy, raw vegetables. Have an apple or some carrot sticks as an afternoon snack instead of a candy bar. Try to have fruits and/or vegetables at every meal. 
· Make exercise part of your daily routine. You may want to start with simple activities, such as walking, bicycling, or slow swimming. Try to be active 30 to 60 minutes every day. You do not need to do all 30 to 60 minutes all at once. For example, you can exercise 3 times a day for 10 or 20 minutes. Moderate exercise is safe for most people, but it is always a good idea to talk to your doctor before starting an exercise program. 
· Keep moving. Radha Goltz the lawn, work in the garden, or [employer]. Take the stairs instead of the elevator at work. · If you smoke, quit.  People who smoke have an increased risk for heart attack, stroke, cancer, and other lung illnesses. Quitting is hard, but there are ways to boost your chance of quitting tobacco for good. ¨ Use nicotine gum, patches, or lozenges. ¨ Ask your doctor about stop-smoking programs and medicines. ¨ Keep trying. In addition to reducing your risk of diseases in the future, you will notice some benefits soon after you stop using tobacco. If you have shortness of breath or asthma symptoms, they will likely get better within a few weeks after you quit. · Limit how much alcohol you drink. Moderate amounts of alcohol (up to 2 drinks a day for men, 1 drink a day for women) are okay. But drinking too much can lead to liver problems, high blood pressure, and other health problems. Family health If you have a family, there are many things you can do together to improve your health. · Eat meals together as a family as often as possible. · Eat healthy foods. This includes fruits, vegetables, lean meats and dairy, and whole grains. · Include your family in your fitness plan. Most people think of activities such as jogging or tennis as the way to fitness, but there are many ways you and your family can be more active. Anything that makes you breathe hard and gets your heart pumping is exercise. Here are some tips: 
¨ Walk to do errands or to take your child to school or the bus. ¨ Go for a family bike ride after dinner instead of watching TV. Where can you learn more? Go to http://tim-jimenez.info/. Enter Z376 in the search box to learn more about \"A Healthy Lifestyle: Care Instructions. \" Current as of: December 7, 2017 Content Version: 11.7 © 7876-6441 [employer]. Care instructions adapted under license by [employer] (which disclaims liability or warranty for this information).  If you have questions about a medical condition or this instruction, always ask your healthcare professional. Didier Gutierrez[employer] disclaims any warranty or liability for your use of this information. Introducing [facility]! [employer] introduces [employer] patient portal. Now you can access parts of your medical record, email your doctor's office, and request medication refills online. 1. In your internet browser, go to [email or web address] 2. Click on the First Time User? Click Here link in the Sign In box. You will see the New Member Sign Up page. 3. Enter your [employer] Access Code exactly as it appears below. You will not need to use this code after youve completed the sign-up process. If you do not sign up before the expiration date, you must request a new code. · [employer] Access Code: [email or web address] Expires: 12/12/2018 10:43 AM 
 
4. Enter the last four digits of your Social Security Number (xxxx) and Date of Birth (mm/dd/yyyy) as indicated and click Submit. You will be taken to the next sign-up page. 5. Create a [employer] ID. This will be your [employer] login ID and cannot be changed, so think of one that is secure and easy to remember. 6. Create a [employer] password. You can change your password at any time. 7. Enter your Password Reset Question and Answer. This can be used at a later time if you forget your password. 8. Enter your e-mail address. You will receive e-mail notification when new information is available in [address] 9. Click Sign Up. You can now view and download portions of your medical record. 10. Click the Download Summary menu link to download a portable copy of your medical information. If you have questions, please visit the Frequently Asked Questions section of the [employer] website. Remember, [employer] is NOT to be used for urgent needs. For medical emergencies, dial 911. Now available from your iPhone and Android! Please provide this summary of care documentation to your next provider. Your primary care clinician is listed as Phys Other. If you have any questions after today's visit, please call [phone].

## 2018-09-13 NOTE — PATIENT INSTRUCTIONS

## 2018-09-13 NOTE — PROGRESS NOTES
Was on the aricept and then he stopped for the neuropsychic testing and per his recommendations he told them he needs to be put back on it   Thinks he was taking the 10 mg of the aricept

## 2018-09-29 NOTE — PROGRESS NOTES
Date:  18     Name:  Moe Hannon  :  1940  MRN:  [id number]     PCP:  Linnea Dickinson MD    Chief Complaint   Patient presents with    Memory Loss       HISTORY OF PRESENT ILLNESS: Follow up for dementia. Since his initial office visit with Dr. Jonathan Anderson, he was started on Aricept for memory management and Cymbalta for depression. He was also sent for neuropsychological testing which does confirm the presence of dementia, which is moderate dementia with psychosis, and depression/anxiety. This testing also demonstrated that he was not competent and that he would need to have supervision. He should not drive. These results have been discussed with the patient and the family by Dr. Silvio Virk, and discussed again today. Although he was started on Aricept at his initial visit, he did stop taking this with recommendation that he should resume it. Current Outpatient Prescriptions   Medication Sig    famotidine (PEPCID) 20 mg tablet Take 20 mg by mouth two (2) times a day.  traZODone (DESYREL) 50 mg tablet Take 50 mg by mouth as needed for Sleep.  DULoxetine (CYMBALTA) 30 mg capsule Take 30 mg by mouth daily.  losartan (COZAAR) 25 mg tablet Take  by mouth daily.  digoxin (LANOXIN) 0.125 mg tablet Take  by mouth daily.  rivaroxaban (XARELTO) 15 mg tab tablet Take  by mouth daily.  iron bisgly,ps-FA-B-C#12-succ 65 mg-65 mg -1,000 mcg (24) tab Take  by mouth.  furosemide (LASIX) 40 mg tablet Take  by mouth as needed.  carvedilol (COREG) 6.25 mg tablet Take  by mouth two (2) times daily (with meals).  tamsulosin (FLOMAX) 0.4 mg capsule Take 0.4 mg by mouth daily.  pentoxifylline CR (TRENTAL) 400 mg CR tablet Take 400 mg by mouth two (2) times a day.  LORazepam (ATIVAN) 0.5 mg tablet Take 0.5 mg by mouth every eight (8) hours as needed for Anxiety.  nitroglycerin (NITROSTAT) 0.4 mg SL tablet 0.4 mg by SubLINGual route every five (5) minutes as needed for Chest Pain.  Up to 3 doses.    donepezil (ARICEPT) 10 mg tablet Take 1 Tab by mouth nightly. Take one tablet at night    pantoprazole (PROTONIX) 40 mg tablet      No current facility-administered medications for this visit. Allergies   Allergen Reactions    Ace Inhibitors Hives     No past medical history on file. No past surgical history on file. Social History     Social History    Marital status:      Spouse name: N/A    Number of children: N/A    Years of education: N/A     Occupational History    Not on file. Social History Main Topics    Smoking status: Not on file    Smokeless tobacco: Not on file    Alcohol use Not on file    Drug use: Not on file    Sexual activity: Not on file     Other Topics Concern    Not on file     Social History Narrative     No family history on file. PHYSICAL EXAMINATION:    Visit Vitals    /64    Pulse 100    Resp 20    Ht 5' 10\" (1.778 m)    Wt 56.2 kg (124 lb)    SpO2 98%    BMI 17.79 kg/m2     General:  Well defined, nourished, and groomed individual in no acute distress. Neck: Supple, nontender, no bruits, no pain with resistance to active range of motion. Heart: Regular rate and rhythm, no murmurs, rub, or gallop. Normal S1S2. Lungs:  Clear to auscultation bilaterally with equal chest expansion, no cough, no wheeze  Musculoskeletal:  Extremities revealed no edema and had full range of motion of joints. Psych:  Good mood and bright affect    NEUROLOGICAL EXAMINATION:     Mental Status:   Alert and oriented to person     Cranial Nerves:    II, III, IV, VI:  Visual acuity grossly intact. Visual fields are normal.    Pupils are equal, round, and reactive to light and accommodation. Extra-ocular movements are full and fluid. Fundoscopic exam was benign, no ptosis or nystagmus. V-XII: Hearing is grossly intact. Facial features are symmetric, with normal sensation and strength.   The palate rises symmetrically and the tongue protrudes midline. Sternocleidomastoids 5/5. Motor Examination: Normal tone, bulk, and strength, 5/5 muscle strength throughout. Coordination:  No resting or intention tremor    Gait and Station:  Steady while walking. No pronator drift. No muscle wasting or fasiculations noted. Reflexes:  DTRs 2+ throughout. ASSESSMENT AND PLAN    ICD-10-CM ICD-9-CM    1. Late onset Alzheimer's disease without behavioral disturbance G30.1 331.0     F02.80 294.10    2. Memory loss R41.3 780.93    3. Hallucinations R44.3 780.1      Discussed diagnosis, prognosis and progression of dementia. Discussed treatment to include purpose to decrease progression but would not halt or reverse disease. He will resume the Aricept at 5mg daily for the next month then increase to 10mg daily thereafter. Will plan to see him back in six weeks and if he is doing fine with the Aricept, will plan to add Namenda XR. Adriana Ortiz

## 2018-12-08 ENCOUNTER — OP HISTORICAL/CONVERTED ENCOUNTER (OUTPATIENT)
Dept: OTHER | Age: 78
End: 2018-12-08

## 2019-01-11 DIAGNOSIS — R44.3 HALLUCINATIONS: ICD-10-CM

## 2019-01-11 DIAGNOSIS — R41.3 MEMORY LOSS: ICD-10-CM

## 2019-01-11 RX ORDER — DONEPEZIL HYDROCHLORIDE 10 MG/1
TABLET, FILM COATED ORAL
Qty: 90 TAB | Refills: 3 | Status: SHIPPED | OUTPATIENT
Start: 2019-01-11

## 2019-01-17 ENCOUNTER — OFFICE VISIT (OUTPATIENT)
Dept: NEUROLOGY | Age: 79
End: 2019-01-17

## 2019-01-17 VITALS
HEART RATE: 54 BPM | OXYGEN SATURATION: 99 % | RESPIRATION RATE: 20 BRPM | BODY MASS INDEX: 17.75 KG/M2 | HEIGHT: 70 IN | DIASTOLIC BLOOD PRESSURE: 70 MMHG | SYSTOLIC BLOOD PRESSURE: 128 MMHG | WEIGHT: 124 LBS

## 2019-01-17 DIAGNOSIS — F02.80 DEMENTIA OF THE ALZHEIMER'S TYPE WITH LATE ONSET WITHOUT BEHAVIORAL DISTURBANCE (HCC): Primary | ICD-10-CM

## 2019-01-17 DIAGNOSIS — G30.1 DEMENTIA OF THE ALZHEIMER'S TYPE WITH LATE ONSET WITHOUT BEHAVIORAL DISTURBANCE (HCC): Primary | ICD-10-CM

## 2019-01-17 RX ORDER — MEMANTINE HYDROCHLORIDE 5 MG-10 MG
KIT ORAL
Qty: 42 TAB | Refills: 0 | Status: SHIPPED | OUTPATIENT
Start: 2019-01-17

## 2019-01-17 RX ORDER — MEMANTINE HYDROCHLORIDE 10 MG/1
10 TABLET ORAL 2 TIMES DAILY
Qty: 180 TAB | Refills: 3 | Status: SHIPPED | OUTPATIENT
Start: 2019-01-17

## 2019-01-17 NOTE — PROGRESS NOTES
Date:  19     Name:  Ronna Mckeon  :  1940  MRN:  [id number]     PCP:  Nesha Martinez MD    Chief Complaint   Patient presents with    Follow-up     late onset alzheimers disease        HISTORY OF PRESENT ILLNESS: Follow up for dementia. At his last office, we discussed the his neuropsychological testing which does confirm the presence of dementia, which is moderate dementia with psychosis, and depression/anxiety. This testing also demonstrated that he was not competent and that he would need to have supervision. He should not drive. He was restarted on Aricept. He has tolerated this without any issues. Memory is about the same. Current Outpatient Medications   Medication Sig    donepezil (ARICEPT) 10 mg tablet TAKE 1 TABLET EVERY DAY AT NIGHT     famotidine (PEPCID) 20 mg tablet Take 20 mg by mouth two (2) times a day.  traZODone (DESYREL) 50 mg tablet Take 50 mg by mouth as needed for Sleep.  DULoxetine (CYMBALTA) 30 mg capsule Take 30 mg by mouth daily.  losartan (COZAAR) 25 mg tablet Take  by mouth daily.  digoxin (LANOXIN) 0.125 mg tablet Take  by mouth daily.  rivaroxaban (XARELTO) 15 mg tab tablet Take  by mouth daily.  iron bisgly,ps-FA-B-C#12-succ 65 mg-65 mg -1,000 mcg (24) tab Take  by mouth.  furosemide (LASIX) 40 mg tablet Take  by mouth as needed.  carvedilol (COREG) 6.25 mg tablet Take  by mouth two (2) times daily (with meals).  tamsulosin (FLOMAX) 0.4 mg capsule Take 0.4 mg by mouth daily.  pentoxifylline CR (TRENTAL) 400 mg CR tablet Take 400 mg by mouth two (2) times a day.  LORazepam (ATIVAN) 0.5 mg tablet Take 0.5 mg by mouth every eight (8) hours as needed for Anxiety.  nitroglycerin (NITROSTAT) 0.4 mg SL tablet 0.4 mg by SubLINGual route every five (5) minutes as needed for Chest Pain. Up to 3 doses.  pantoprazole (PROTONIX) 40 mg tablet      No current facility-administered medications for this visit.       Allergies   Allergen Reactions    Ace Inhibitors Hives     History reviewed. No pertinent past medical history. History reviewed. No pertinent surgical history. Social History     Socioeconomic History    Marital status:      Spouse name: Not on file    Number of children: Not on file    Years of education: Not on file    Highest education level: Not on file   Social Needs    Financial resource strain: Not on file    Food insecurity - worry: Not on file    Food insecurity - inability: Not on file    Transportation needs - medical: Not on file   [employer] needs - non-medical: Not on file   Occupational History    Not on file   Tobacco Use    Smoking status: Not on file   Substance and Sexual Activity    Alcohol use: Not on file    Drug use: Not on file    Sexual activity: Not on file   Other Topics Concern    Not on file   Social History Narrative    Not on file     History reviewed. No pertinent family history. PHYSICAL EXAMINATION:    Visit Vitals  /70   Pulse (!) 54   Resp 20   Ht 5' 10\" (1.778 m)   Wt 56.2 kg (124 lb)   SpO2 99%   BMI 17.79 kg/m²     General:  Well defined, nourished, and groomed individual in no acute distress. Neck: Supple, nontender, no bruits, no pain with resistance to active range of motion. Heart: Regular rate and rhythm, no murmurs, rub, or gallop. Normal S1S2. Lungs:  Clear to auscultation bilaterally with equal chest expansion, no cough, no wheeze  Musculoskeletal:  Extremities revealed no edema and had full range of motion of joints. Psych:  Good mood and bright affect    NEUROLOGICAL EXAMINATION:     Mental Status:   Alert and oriented to person     Cranial Nerves:    II, III, IV, VI:  Visual acuity grossly intact. Visual fields are normal.    Pupils are equal, round, and reactive to light and accommodation. Extra-ocular movements are full and fluid. Fundoscopic exam was benign, no ptosis or nystagmus. V-XII: Hearing is grossly intact.   Facial features are symmetric, with normal sensation and strength. The palate rises symmetrically and the tongue protrudes midline. Sternocleidomastoids 5/5. Motor Examination: Normal tone, bulk, and strength, 5/5 muscle strength throughout. Coordination:  No resting or intention tremor    Gait and Station:  Steady while walking. No pronator drift. No muscle wasting or fasiculations noted. Reflexes:  DTRs 2+ throughout. ASSESSMENT AND PLAN    ICD-10-CM ICD-9-CM    1. Dementia of the Alzheimer's type with late onset without behavioral disturbance G30.1 331.0 memantine (NAMENDA TITRATION SAULO) 5-10 mg DsPk    F02.80 294.10 memantine (NAMENDA) 10 mg tablet      Late onset dementia, likely Alzheimer's type. Diagnosis, prognosis, and progression were discussed. He will continue with Aricept. We discussed dual therapy and he will be started on Namenda. Purpose and potential side effects were discussed and his family has verbalized understanding. Follow up in three months    [facility].  Chinyere Boo

## 2019-01-17 NOTE — PROGRESS NOTES
Per the patient he has been doing okay   Memory is the same as when he was here last    Fluid around his lungs the first part of December was in the hospital for that

## 2019-01-17 NOTE — PATIENT INSTRUCTIONS
A Healthy Lifestyle: Care Instructions  Your Care Instructions    A healthy lifestyle can help you feel good, stay at a healthy weight, and have plenty of energy for both work and play. A healthy lifestyle is something you can share with your whole family. A healthy lifestyle also can lower your risk for serious health problems, such as high blood pressure, heart disease, and diabetes. You can follow a few steps listed below to improve your health and the health of your family. Follow-up care is a key part of your treatment and safety. Be sure to make and go to all appointments, and call your doctor if you are having problems. It's also a good idea to know your test results and keep a list of the medicines you take. How can you care for yourself at home? · Do not eat too much sugar, fat, or fast foods. You can still have dessert and treats now and then. The goal is moderation. · Start small to improve your eating habits. Pay attention to portion sizes, drink less juice and soda pop, and eat more fruits and vegetables. ? Eat a healthy amount of food. A 3-ounce serving of meat, for example, is about the size of a deck of cards. Fill the rest of your plate with vegetables and whole grains. ? Limit the amount of soda and sports drinks you have every day. Drink more water when you are thirsty. ? Eat at least 5 servings of fruits and vegetables every day. It may seem like a lot, but it is not hard to reach this goal. A serving or helping is 1 piece of fruit, 1 cup of vegetables, or 2 cups of leafy, raw vegetables. Have an apple or some carrot sticks as an afternoon snack instead of a candy bar. Try to have fruits and/or vegetables at every meal.  · Make exercise part of your daily routine. You may want to start with simple activities, such as walking, bicycling, or slow swimming. Try to be active 30 to 60 minutes every day. You do not need to do all 30 to 60 minutes all at once.  For example, you can exercise 3 times a day for 10 or 20 minutes. Moderate exercise is safe for most people, but it is always a good idea to talk to your doctor before starting an exercise program.  · Keep moving. Alexis Dine the lawn, work in the garden, or [employer]. Take the stairs instead of the elevator at work. · If you smoke, quit. People who smoke have an increased risk for heart attack, stroke, cancer, and other lung illnesses. Quitting is hard, but there are ways to boost your chance of quitting tobacco for good. ? Use nicotine gum, patches, or lozenges. ? Ask your doctor about stop-smoking programs and medicines. ? Keep trying. In addition to reducing your risk of diseases in the future, you will notice some benefits soon after you stop using tobacco. If you have shortness of breath or asthma symptoms, they will likely get better within a few weeks after you quit. · Limit how much alcohol you drink. Moderate amounts of alcohol (up to 2 drinks a day for men, 1 drink a day for women) are okay. But drinking too much can lead to liver problems, high blood pressure, and other health problems. Family health  If you have a family, there are many things you can do together to improve your health. · Eat meals together as a family as often as possible. · Eat healthy foods. This includes fruits, vegetables, lean meats and dairy, and whole grains. · Include your family in your fitness plan. Most people think of activities such as jogging or tennis as the way to fitness, but there are many ways you and your family can be more active. Anything that makes you breathe hard and gets your heart pumping is exercise. Here are some tips:  ? Walk to do errands or to take your child to school or the bus.  ? Go for a family bike ride after dinner instead of watching TV. Where can you learn more? Go to http://tim-jimenez.info/. Enter H650 in the search box to learn more about \"A Healthy Lifestyle: Care Instructions. \"  Current as of: December 7, 2017  Content Version: 11.8  © 2139-6910 [employer]. Care instructions adapted under license by [employer] (which disclaims liability or warranty for this information). If you have questions about a medical condition or this instruction, always ask your healthcare professional. [address] any warranty or liability for your use of this information.

## 2019-01-28 ENCOUNTER — TELEPHONE (OUTPATIENT)
Dept: NEUROLOGY | Age: 79
End: 2019-01-28

## 2019-01-29 NOTE — TELEPHONE ENCOUNTER
----- Message from Juan Carlos Munguia sent at 1/29/2019 12:59 PM EST -----  Regarding: Naeem/Alvin Mata called from [address] in regards to clarification on the medication memantine 5-10 mg . Best contact number is [phone] .

## 2019-01-30 NOTE — TELEPHONE ENCOUNTER
----- Message from oJcelyne Montemayor sent at 1/30/2019 12:44 PM EST -----  Regarding: Veryl Joel NP/Telephone  The patient's daughter Nadira Flores is requesting a call back from the NP to confirm how she should give the patient Rx Namenda.  [phone]

## 2019-01-31 ENCOUNTER — TELEPHONE (OUTPATIENT)
Dept: NEUROLOGY | Age: 79
End: 2019-01-31

## 2019-01-31 NOTE — TELEPHONE ENCOUNTER
----- Message from Flor Weston sent at 1/31/2019  2:54 PM EST -----  Regarding: [facility]/Telephone   Dakota Dawn, daughter, returned a missed call from [facility] regarding [id number] (no additional information was disclosed). Best contact [phone].

## 2019-03-20 ENCOUNTER — ED HISTORICAL/CONVERTED ENCOUNTER (OUTPATIENT)
Dept: OTHER | Age: 79
End: 2019-03-20

## 2019-03-21 ENCOUNTER — TELEPHONE (OUTPATIENT)
Dept: NEUROLOGY | Age: 79
End: 2019-03-21

## 2019-03-22 NOTE — TELEPHONE ENCOUNTER
Pt has been on medication Namenda for a little over a month. He has been having some GI issues since shortly after starting the medication. Having diarrhea, nausea. The last couple of days they have not given him the medication and he has been doing better. It is starting to affect him in other ways. She just recently had to take him to the urgent care because he was dehydrated and he had to get fluids. She just doesn't think it is worth him taking it anymore if it is going to cause other issues. Please advise     It's fine for him to stop it. Per NP. Contacted the pts caregiver and she has verbalized understanding. She was actually just bringing him from the hospital He was having CHF issues. She was advised to take him to see a GI doctor for some of those issues.

## 2019-03-23 ENCOUNTER — IP HISTORICAL/CONVERTED ENCOUNTER (OUTPATIENT)
Dept: OTHER | Age: 79
End: 2019-03-23

## 2024-07-03 NOTE — TELEPHONE ENCOUNTER
Ortiz Lowry (HIPAA verified) calling stating Patient's Namenda medication, started 6 weeks ago, is possible causing GI issues. Would like a call from the nurse to discuss.  [phone] Patient called and I informed her of this.  She is not sure if she will take care of the insurance or apply for PA but she would figure it out and let us know if further assistance is needed.